# Patient Record
Sex: FEMALE | Race: WHITE | NOT HISPANIC OR LATINO | Employment: OTHER | URBAN - METROPOLITAN AREA
[De-identification: names, ages, dates, MRNs, and addresses within clinical notes are randomized per-mention and may not be internally consistent; named-entity substitution may affect disease eponyms.]

---

## 2023-01-09 ENCOUNTER — HOSPITAL ENCOUNTER (INPATIENT)
Facility: HOSPITAL | Age: 88
LOS: 2 days | Discharge: HOME | DRG: 291 | End: 2023-01-12
Attending: STUDENT IN AN ORGANIZED HEALTH CARE EDUCATION/TRAINING PROGRAM | Admitting: INTERNAL MEDICINE
Payer: MEDICARE

## 2023-01-09 DIAGNOSIS — R06.02 SOB (SHORTNESS OF BREATH): ICD-10-CM

## 2023-01-09 DIAGNOSIS — M79.89 LEG SWELLING: ICD-10-CM

## 2023-01-09 DIAGNOSIS — R07.9 CHEST PAIN: ICD-10-CM

## 2023-01-09 DIAGNOSIS — I50.9 CHF (CONGESTIVE HEART FAILURE): ICD-10-CM

## 2023-01-09 DIAGNOSIS — N39.0 URINARY TRACT INFECTION WITHOUT HEMATURIA, SITE UNSPECIFIED: ICD-10-CM

## 2023-01-09 DIAGNOSIS — N17.9 AKI (ACUTE KIDNEY INJURY): ICD-10-CM

## 2023-01-09 DIAGNOSIS — I50.9 CONGESTIVE HEART FAILURE, UNSPECIFIED HF CHRONICITY, UNSPECIFIED HEART FAILURE TYPE: Primary | ICD-10-CM

## 2023-01-09 LAB
ALBUMIN SERPL-MCNC: 3.6 G/DL (ref 3.4–4.8)
ALBUMIN/GLOB SERPL: 1 RATIO (ref 1.1–2)
ALP SERPL-CCNC: 151 UNIT/L (ref 40–150)
ALT SERPL-CCNC: 29 UNIT/L (ref 0–55)
APPEARANCE UR: CLEAR
AST SERPL-CCNC: 24 UNIT/L (ref 5–34)
BACTERIA #/AREA URNS AUTO: ABNORMAL /HPF
BASOPHILS # BLD AUTO: 0.04 X10(3)/MCL (ref 0–0.2)
BASOPHILS NFR BLD AUTO: 0.6 %
BILIRUB UR QL STRIP.AUTO: NEGATIVE MG/DL
BILIRUBIN DIRECT+TOT PNL SERPL-MCNC: 0.5 MG/DL
BNP BLD-MCNC: 3759.9 PG/ML
BUN SERPL-MCNC: 34.3 MG/DL (ref 9.8–20.1)
CALCIUM SERPL-MCNC: 9.4 MG/DL (ref 8.4–10.2)
CHLORIDE SERPL-SCNC: 99 MMOL/L (ref 98–111)
CO2 SERPL-SCNC: 26 MMOL/L (ref 23–31)
COLOR UR AUTO: YELLOW
CREAT SERPL-MCNC: 1.8 MG/DL (ref 0.55–1.02)
EOSINOPHIL # BLD AUTO: 0.2 X10(3)/MCL (ref 0–0.9)
EOSINOPHIL NFR BLD AUTO: 2.9 %
ERYTHROCYTE [DISTWIDTH] IN BLOOD BY AUTOMATED COUNT: 15.3 % (ref 11.5–17)
GFR SERPLBLD CREATININE-BSD FMLA CKD-EPI: 26 MLS/MIN/1.73/M2
GLOBULIN SER-MCNC: 3.7 GM/DL (ref 2.4–3.5)
GLUCOSE SERPL-MCNC: 109 MG/DL (ref 75–121)
GLUCOSE UR QL STRIP.AUTO: NEGATIVE MG/DL
HCT VFR BLD AUTO: 30 % (ref 37–47)
HGB BLD-MCNC: 9.3 GM/DL (ref 12–16)
HYALINE CASTS URNS QL MICRO: ABNORMAL /LPF
IMM GRANULOCYTES # BLD AUTO: 0.02 X10(3)/MCL (ref 0–0.04)
IMM GRANULOCYTES NFR BLD AUTO: 0.3 %
INR BLD: 1.15 (ref 0–1.3)
KETONES UR QL STRIP.AUTO: NEGATIVE MG/DL
LEUKOCYTE ESTERASE UR QL STRIP.AUTO: ABNORMAL UNIT/L
LYMPHOCYTES # BLD AUTO: 1.2 X10(3)/MCL (ref 0.6–4.6)
LYMPHOCYTES NFR BLD AUTO: 17.6 %
MAGNESIUM SERPL-MCNC: 1.8 MG/DL (ref 1.6–2.6)
MCH RBC QN AUTO: 27.4 PG
MCHC RBC AUTO-ENTMCNC: 31 MG/DL (ref 33–36)
MCV RBC AUTO: 88.2 FL (ref 80–94)
MONOCYTES # BLD AUTO: 0.74 X10(3)/MCL (ref 0.1–1.3)
MONOCYTES NFR BLD AUTO: 10.8 %
NEUTROPHILS # BLD AUTO: 4.63 X10(3)/MCL (ref 2.1–9.2)
NEUTROPHILS NFR BLD AUTO: 67.8 %
NITRITE UR QL STRIP.AUTO: NEGATIVE
NRBC BLD AUTO-RTO: 0 %
PH UR STRIP.AUTO: 5.5 [PH]
PLATELET # BLD AUTO: 326 X10(3)/MCL (ref 130–400)
PMV BLD AUTO: 9.8 FL (ref 7.4–10.4)
POTASSIUM SERPL-SCNC: 4.5 MMOL/L (ref 3.5–5.1)
PROT SERPL-MCNC: 7.3 GM/DL (ref 5.8–7.6)
PROT UR QL STRIP.AUTO: ABNORMAL MG/DL
PROTHROMBIN TIME: 14.6 SECONDS (ref 12.5–14.5)
RBC # BLD AUTO: 3.4 X10(6)/MCL (ref 4.2–5.4)
RBC #/AREA URNS AUTO: ABNORMAL /HPF
RBC UR QL AUTO: NEGATIVE UNIT/L
SODIUM SERPL-SCNC: 135 MMOL/L (ref 132–146)
SP GR UR STRIP.AUTO: 1.02 (ref 1–1.03)
SQUAMOUS #/AREA URNS AUTO: <5 /HPF
TROPONIN I SERPL-MCNC: 0.03 NG/ML (ref 0–0.04)
UROBILINOGEN UR STRIP-ACNC: 0.2 MG/DL
WBC # SPEC AUTO: 6.8 X10(3)/MCL (ref 4.5–11.5)
WBC #/AREA URNS AUTO: 20 /HPF

## 2023-01-09 PROCEDURE — 93005 ELECTROCARDIOGRAM TRACING: CPT

## 2023-01-09 PROCEDURE — 84484 ASSAY OF TROPONIN QUANT: CPT | Performed by: NURSE PRACTITIONER

## 2023-01-09 PROCEDURE — 85610 PROTHROMBIN TIME: CPT | Performed by: NURSE PRACTITIONER

## 2023-01-09 PROCEDURE — 63600175 PHARM REV CODE 636 W HCPCS: Performed by: NURSE PRACTITIONER

## 2023-01-09 PROCEDURE — 83735 ASSAY OF MAGNESIUM: CPT | Performed by: STUDENT IN AN ORGANIZED HEALTH CARE EDUCATION/TRAINING PROGRAM

## 2023-01-09 PROCEDURE — 80053 COMPREHEN METABOLIC PANEL: CPT | Performed by: NURSE PRACTITIONER

## 2023-01-09 PROCEDURE — 93010 ELECTROCARDIOGRAM REPORT: CPT | Mod: ,,, | Performed by: STUDENT IN AN ORGANIZED HEALTH CARE EDUCATION/TRAINING PROGRAM

## 2023-01-09 PROCEDURE — 81001 URINALYSIS AUTO W/SCOPE: CPT | Performed by: STUDENT IN AN ORGANIZED HEALTH CARE EDUCATION/TRAINING PROGRAM

## 2023-01-09 PROCEDURE — 85025 COMPLETE CBC W/AUTO DIFF WBC: CPT | Performed by: NURSE PRACTITIONER

## 2023-01-09 PROCEDURE — 87184 SC STD DISK METHOD PER PLATE: CPT | Performed by: STUDENT IN AN ORGANIZED HEALTH CARE EDUCATION/TRAINING PROGRAM

## 2023-01-09 PROCEDURE — 87077 CULTURE AEROBIC IDENTIFY: CPT | Performed by: STUDENT IN AN ORGANIZED HEALTH CARE EDUCATION/TRAINING PROGRAM

## 2023-01-09 PROCEDURE — 93010 EKG 12-LEAD: ICD-10-PCS | Mod: ,,, | Performed by: STUDENT IN AN ORGANIZED HEALTH CARE EDUCATION/TRAINING PROGRAM

## 2023-01-09 PROCEDURE — 96374 THER/PROPH/DIAG INJ IV PUSH: CPT

## 2023-01-09 PROCEDURE — 99285 EMERGENCY DEPT VISIT HI MDM: CPT | Mod: 25

## 2023-01-09 PROCEDURE — 83880 ASSAY OF NATRIURETIC PEPTIDE: CPT | Performed by: NURSE PRACTITIONER

## 2023-01-09 RX ORDER — FUROSEMIDE 10 MG/ML
60 INJECTION INTRAMUSCULAR; INTRAVENOUS
Status: COMPLETED | OUTPATIENT
Start: 2023-01-09 | End: 2023-01-09

## 2023-01-09 RX ADMIN — FUROSEMIDE 60 MG: 10 INJECTION, SOLUTION INTRAMUSCULAR; INTRAVENOUS at 11:01

## 2023-01-09 NOTE — FIRST PROVIDER EVALUATION
"Medical screening examination initiated.  I have conducted a focused provider triage encounter, findings are as follows:    Brief history of present illness:  Patient and her son state that patient has had SOB. States swelling to her lower extremities. States that she was seen at Urgent Care and was told that she had "fluid" in her lungs.     There were no vitals filed for this visit.    Pertinent physical exam:  Awake, alert    Brief workup plan:  Labs, EKG, Chest X-ray    Preliminary workup initiated; this workup will be continued and followed by the physician or advanced practice provider that is assigned to the patient when roomed.  "

## 2023-01-10 LAB
ANION GAP SERPL CALC-SCNC: 12 MEQ/L
BASOPHILS # BLD AUTO: 0.06 X10(3)/MCL (ref 0–0.2)
BASOPHILS NFR BLD AUTO: 0.8 %
BUN SERPL-MCNC: 35.1 MG/DL (ref 9.8–20.1)
CALCIUM SERPL-MCNC: 9.5 MG/DL (ref 8.4–10.2)
CHLORIDE SERPL-SCNC: 99 MMOL/L (ref 98–111)
CO2 SERPL-SCNC: 23 MMOL/L (ref 23–31)
CREAT SERPL-MCNC: 1.66 MG/DL (ref 0.55–1.02)
CREAT/UREA NIT SERPL: 21
EOSINOPHIL # BLD AUTO: 0.26 X10(3)/MCL (ref 0–0.9)
EOSINOPHIL NFR BLD AUTO: 3.5 %
ERYTHROCYTE [DISTWIDTH] IN BLOOD BY AUTOMATED COUNT: 15.3 % (ref 11.5–17)
GFR SERPLBLD CREATININE-BSD FMLA CKD-EPI: 29 MLS/MIN/1.73/M2
GLUCOSE SERPL-MCNC: 102 MG/DL (ref 75–121)
HCT VFR BLD AUTO: 31.2 % (ref 37–47)
HGB BLD-MCNC: 9.8 GM/DL (ref 12–16)
IMM GRANULOCYTES # BLD AUTO: 0.02 X10(3)/MCL (ref 0–0.04)
IMM GRANULOCYTES NFR BLD AUTO: 0.3 %
LYMPHOCYTES # BLD AUTO: 1.48 X10(3)/MCL (ref 0.6–4.6)
LYMPHOCYTES NFR BLD AUTO: 20.1 %
MAGNESIUM SERPL-MCNC: 1.8 MG/DL (ref 1.6–2.6)
MCH RBC QN AUTO: 27.3 PG
MCHC RBC AUTO-ENTMCNC: 31.4 MG/DL (ref 33–36)
MCV RBC AUTO: 86.9 FL (ref 80–94)
MONOCYTES # BLD AUTO: 0.87 X10(3)/MCL (ref 0.1–1.3)
MONOCYTES NFR BLD AUTO: 11.8 %
NEUTROPHILS # BLD AUTO: 4.69 X10(3)/MCL (ref 2.1–9.2)
NEUTROPHILS NFR BLD AUTO: 63.5 %
NRBC BLD AUTO-RTO: 0 %
PLATELET # BLD AUTO: 291 X10(3)/MCL (ref 130–400)
PMV BLD AUTO: 10.9 FL (ref 7.4–10.4)
POTASSIUM SERPL-SCNC: 4.4 MMOL/L (ref 3.5–5.1)
RBC # BLD AUTO: 3.59 X10(6)/MCL (ref 4.2–5.4)
SARS-COV-2 RDRP RESP QL NAA+PROBE: NEGATIVE
SODIUM SERPL-SCNC: 134 MMOL/L (ref 132–146)
WBC # SPEC AUTO: 7.4 X10(3)/MCL (ref 4.5–11.5)

## 2023-01-10 PROCEDURE — 80048 BASIC METABOLIC PNL TOTAL CA: CPT | Performed by: STUDENT IN AN ORGANIZED HEALTH CARE EDUCATION/TRAINING PROGRAM

## 2023-01-10 PROCEDURE — 83735 ASSAY OF MAGNESIUM: CPT | Performed by: STUDENT IN AN ORGANIZED HEALTH CARE EDUCATION/TRAINING PROGRAM

## 2023-01-10 PROCEDURE — 25000003 PHARM REV CODE 250: Performed by: STUDENT IN AN ORGANIZED HEALTH CARE EDUCATION/TRAINING PROGRAM

## 2023-01-10 PROCEDURE — 63600175 PHARM REV CODE 636 W HCPCS: Performed by: STUDENT IN AN ORGANIZED HEALTH CARE EDUCATION/TRAINING PROGRAM

## 2023-01-10 PROCEDURE — 85025 COMPLETE CBC W/AUTO DIFF WBC: CPT | Performed by: STUDENT IN AN ORGANIZED HEALTH CARE EDUCATION/TRAINING PROGRAM

## 2023-01-10 PROCEDURE — 21400001 HC TELEMETRY ROOM

## 2023-01-10 PROCEDURE — 87635 SARS-COV-2 COVID-19 AMP PRB: CPT | Performed by: STUDENT IN AN ORGANIZED HEALTH CARE EDUCATION/TRAINING PROGRAM

## 2023-01-10 RX ORDER — ALPRAZOLAM 0.25 MG/1
0.25 TABLET ORAL
Status: ACTIVE | OUTPATIENT
Start: 2023-01-10 | End: 2023-01-11

## 2023-01-10 RX ORDER — ONDANSETRON 2 MG/ML
4 INJECTION INTRAMUSCULAR; INTRAVENOUS EVERY 8 HOURS PRN
Status: DISCONTINUED | OUTPATIENT
Start: 2023-01-10 | End: 2023-01-11

## 2023-01-10 RX ORDER — SODIUM CHLORIDE 0.9 % (FLUSH) 0.9 %
10 SYRINGE (ML) INJECTION
Status: DISCONTINUED | OUTPATIENT
Start: 2023-01-10 | End: 2023-01-12 | Stop reason: HOSPADM

## 2023-01-10 RX ORDER — ACETAMINOPHEN 325 MG/1
650 TABLET ORAL EVERY 8 HOURS PRN
Status: DISCONTINUED | OUTPATIENT
Start: 2023-01-10 | End: 2023-01-12 | Stop reason: HOSPADM

## 2023-01-10 RX ORDER — TALC
6 POWDER (GRAM) TOPICAL NIGHTLY PRN
Status: DISCONTINUED | OUTPATIENT
Start: 2023-01-10 | End: 2023-01-12 | Stop reason: HOSPADM

## 2023-01-10 RX ORDER — FUROSEMIDE 10 MG/ML
40 INJECTION INTRAMUSCULAR; INTRAVENOUS
Status: DISCONTINUED | OUTPATIENT
Start: 2023-01-11 | End: 2023-01-11

## 2023-01-10 RX ADMIN — Medication 6 MG: at 12:01

## 2023-01-10 RX ADMIN — Medication 6 MG: at 08:01

## 2023-01-10 RX ADMIN — ACETAMINOPHEN 650 MG: 325 TABLET, FILM COATED ORAL at 01:01

## 2023-01-10 RX ADMIN — ACETAMINOPHEN 650 MG: 325 TABLET, FILM COATED ORAL at 02:01

## 2023-01-10 RX ADMIN — CEFTRIAXONE 1 G: 1 INJECTION, POWDER, FOR SOLUTION INTRAMUSCULAR; INTRAVENOUS at 03:01

## 2023-01-10 NOTE — ED PROVIDER NOTES
Encounter Date: 1/9/2023    SCRIBE #1 NOTE: I, Zee Crockett, am scribing for, and in the presence of,  Pio Fraire IV, MD. I have scribed the following portions of the note - the EKG reading. Other sections scribed: HPI, ROS, PE, MDM.     History     Chief Complaint   Patient presents with    Shortness of Breath     SOB x1 week. Sent by [facility] for fluids on lungs. Also reports leg swelling     89 Y.O. female with a history of AFIB, CHF, and pulmonary HTN presents to the ED after being sent by [facility] in [place] for fluid on lungs. Also reports one episode of pressure-like CP 3 days ago.    Per Pt's son: Pt has been SOB for 1 week with leg swelling. States that she takes 10mg of Lasix per day, but that it is not helping. Further reports that she has worsening orthopnea and is urinating less than normal.    Per chart review: Admitted to Dr. Arteaga 09/2021 for pleural effusion, abdominal pain, nausea, vomiting, and elevated lipase.    Pt's PCP is Keyon Arteaga MD.    The history is provided by the patient, a relative and medical records. No  was used.   Review of patient's allergies indicates:  No Known Allergies  History reviewed. No pertinent past medical history.  History reviewed. No pertinent surgical history.  History reviewed. No pertinent family history.     Review of Systems   Constitutional:  Negative for chills and fever.   HENT:  Negative for congestion, rhinorrhea and sore throat.    Eyes:  Negative for visual disturbance.   Respiratory:  Positive for shortness of breath. Negative for cough.         Orthopnea.   Cardiovascular:  Positive for leg swelling. Negative for chest pain.   Gastrointestinal:  Negative for abdominal pain, nausea and vomiting.   Genitourinary:  Positive for decreased urine volume. Negative for dysuria, hematuria, vaginal bleeding and vaginal discharge.   Musculoskeletal:  Negative for joint swelling.   Skin:  Negative for rash.   Neurological:  Negative for weakness.    Psychiatric/Behavioral:  Negative for confusion.      Physical Exam     Initial Vitals [01/09/23 1756]   BP Pulse Resp Temp SpO2   135/73 107 18 98.2 °F (36.8 °C) 96 %      MAP       --         Physical Exam    Nursing note and vitals reviewed.  Constitutional: She is not diaphoretic. No distress.   HENT:   Head: Normocephalic and atraumatic.   Eyes: EOM are normal. Pupils are equal, round, and reactive to light.   Neck: Neck supple.   Normal range of motion.  Cardiovascular:  Normal rate and regular rhythm.           No murmur heard.  Pulmonary/Chest: No respiratory distress. She has no wheezes. She has rales.   Faint bibasilar crackles bilaterally.   Abdominal: Abdomen is soft. She exhibits no distension. There is no abdominal tenderness.   Musculoskeletal:         General: Edema present. Normal range of motion.      Cervical back: Normal range of motion and neck supple.      Comments: 2+ pitting edema of LE up to knee bilaterally.     Neurological: She is alert and oriented to person, place, and time. She has normal strength.   Skin: Skin is warm. No rash noted.   Psychiatric: She has a normal mood and affect.       ED Course   Procedures  Labs Reviewed   COMPREHENSIVE METABOLIC PANEL - Abnormal; Notable for the following components:       Result Value    Blood Urea Nitrogen 34.3 (*)     Creatinine 1.80 (*)     Globulin 3.7 (*)     Albumin/Globulin Ratio 1.0 (*)     Alkaline Phosphatase 151 (*)     All other components within normal limits   B-TYPE NATRIURETIC PEPTIDE - Abnormal; Notable for the following components:    Natriuretic Peptide 3,759.9 (*)     All other components within normal limits   PROTIME-INR - Abnormal; Notable for the following components:    PT 14.6 (*)     All other components within normal limits   CBC WITH DIFFERENTIAL - Abnormal; Notable for the following components:    RBC 3.40 (*)     Hgb 9.3 (*)     Hct 30.0 (*)     MCHC 31.0 (*)     All other components within normal limits    URINALYSIS, REFLEX TO URINE CULTURE - Abnormal; Notable for the following components:    Protein, UA Trace (*)     Leukocyte Esterase, UA 2+ (*)     All other components within normal limits   URINALYSIS, MICROSCOPIC - Abnormal; Notable for the following components:    WBC, UA 20 (*)     Bacteria, UA Few (*)     Hyaline Casts, UA Few (*)     All other components within normal limits   TROPONIN I - Normal   MAGNESIUM - Normal   CULTURE, URINE   CBC W/ AUTO DIFFERENTIAL    Narrative:     The following orders were created for panel order CBC Auto Differential.  Procedure                               Abnormality         Status                     ---------                               -----------         ------                     CBC with Differential[[id number]]        Abnormal            Final result                 Please view results for these tests on the individual orders.   SARS-COV-2 RNA AMPLIFICATION, QUAL   CBC W/ AUTO DIFFERENTIAL    Narrative:     The following orders were created for panel order CBC auto differential.  Procedure                               Abnormality         Status                     ---------                               -----------         ------                     CBC with Differential[[id number]]                                                         Please view results for these tests on the individual orders.   BASIC METABOLIC PANEL   MAGNESIUM   CBC WITH DIFFERENTIAL     EKG Readings: (Independently Interpreted)   Initial Reading: No STEMI. Rhythm: Sinus Tachycardia. Heart Rate: 101. Ectopy: No Ectopy. Conduction: Normal. ST Segments: Normal ST Segments. T Waves Flipped: I, AVL, V5 and V6. Clinical Impression: Sinus Tachycardia   EKG performed on 1/9/2023 at 1759. No prior for comparison.   ECG Results              EKG 12-lead (Final result)  Result time 01/09/23 18:41:10      Final result by Interface, Lab In [facility] (01/09/23 18:41:10)                   Narrative:    Test  Reason : R06.02,    Vent. Rate : 101 BPM     Atrial Rate : 101 BPM     P-R Int : 144 ms          QRS Dur : 086 ms      QT Int : 336 ms       P-R-T Axes : 061 084 031 degrees     QTc Int : 435 ms    Sinus tachycardia  Nonspecific T wave abnormality  Abnormal ECG  No previous ECGs available  Confirmed by Guilherme Casanova MD ([id number]) on 1/9/2023 6:41:02 PM    Referred By:             Confirmed By:Guilherme Casanova MD                                  Imaging Results              X-Ray Chest PA And Lateral (Final result)  Result time 01/09/23 18:35:51      Final result by Wei Cabrera MD (01/09/23 18:35:51)                   Impression:      Findings consistent with CHF      Electronically signed by: Wei Cabrera  Date:    01/09/2023  Time:    18:35               Narrative:    EXAMINATION:  XR CHEST PA AND LATERAL    CLINICAL HISTORY:  Shortness of Breath;    TECHNIQUE:  PA and lateral views of the chest were performed.    COMPARISON:  08/03/2021    FINDINGS:  There is pulmonary edema and pulmonary venous congestion bilaterally.  There is a small left-sided pleural effusion..  The heart is enlarged in size.  Aorta is unremarkable.  The bones and joints show no acute abnormality.                                       Medications   sodium chloride 0.9% flush 10 mL (has no administration in time range)   melatonin tablet 6 mg (6 mg Oral Given 1/10/23 0041)   acetaminophen tablet 650 mg (650 mg Oral Given 1/10/23 0205)   ondansetron injection 4 mg (has no administration in time range)   furosemide injection 40 mg (has no administration in time range)   furosemide injection 60 mg (60 mg Intravenous Given 1/9/23 2326)     Medical Decision Making:   History:   I obtained history from: someone other than patient.       <> Summary of History: Per Pt's son: Pt has been SOB for 1 week with leg swelling. States that she takes 10mg of Lasix per day, but that it is not helping. Further reports that she has worsening orthopnea  and is urinating less than normal.      Old Records Summarized: records from previous admission(s).       <> Summary of Records: Admitted to Dr. Arteaga 09/2021 for pleural effusion, abdominal pain, nausea, vomiting, and elevated lipase.  Initial Assessment:   89-year-old with a history of CHF diastolic heart failure as well as pulmonary hypertension  Presenting for worsening shortness of breath orthopnea leg swelling went to the walk-in clinic today was told she had fluid in her lungs  Patient is volume overloaded on exam satting well  Chest x-ray with pulmonary edema BNP and 3000 mild JEIMY  Will admit to primary care Dr. Arteaga at this time will diurese with IV Lasix    Differential Diagnosis:   Judging by the patient's chief complaint and pertinent history, the patient has the following possible differential diagnoses, including but not limited to the following.  Some of these are deemed to be lower likelihood and some more likely based on my physical exam and history combined with possible lab work and/or imaging studies.   Please see the pertinent studies, and refer to the HPI.  Some of these diagnoses will take further evaluation to fully rule out, perhaps as an outpatient and the patient was encouraged to follow up when discharged for more comprehensive evaluation.    ACS, pneumonia, COVID/Flu, congestive heart failure, asthma, COPD, pleural effusion, pulmonary edema, acute bronchitis, PE, pneumothorax, hemothorax, aortic dissection, electrolyte abnormalities, anemia, anxiety     Clinical Tests:   Lab Tests: Ordered and Reviewed  Radiological Study: Ordered and Reviewed  Medical Tests: Ordered and Reviewed        Scribe Attestation:   Scribe #1: I performed the above scribed service and the documentation accurately describes the services I performed. I attest to the accuracy of the note.      Medical Decision Making  Problems Addressed:  JEIMY (acute kidney injury): acute illness or injury that poses a threat to  life or bodily functions  Congestive heart failure, unspecified HF chronicity, unspecified heart failure type: chronic illness or injury with exacerbation, progression, or side effects of treatment  Leg swelling: acute illness or injury that poses a threat to life or bodily functions  SOB (shortness of breath): acute illness or injury that poses a threat to life or bodily functions  Urinary tract infection without hematuria, site unspecified: acute illness or injury that poses a threat to life or bodily functions      Amount and/or Complexity of Data Reviewed  Independent Historian: caregiver  (see above for summary)  External Data Reviewed: notes from previous admissions, prior labs, prior EKGs, prior imaging, and prescription medications  (see above for summary)  Risk and benefits of testing: discussed   Labs: ordered and reviewed  Radiology: ordered and independent interpretation performed (see above)  ECG/medicine tests: ordered and independent interpretation performed (see above)  Discussion of management or test interpretation with external provider(s): discussed with primary care physician    Risk  Parenteral medications  Drug therapy requiring intense monitoring for toxicity   Decision regarding hospitalization    Critical Care  none            ED Course as of 01/10/23 0255   Mon Jan 09, 2023   2240 Dr. Chandler marcos. [AS]   2320 Admitted to Dr. Arteaga. [AS]      ED Course User Index  [AS] Melisa Keira                   Clinical Impression:   Final diagnoses:  [R06.02] SOB (shortness of breath)  [I50.9] Congestive heart failure, unspecified HF chronicity, unspecified heart failure type (Primary)  [M79.89] Leg swelling        ED Disposition Condition    Admit Stable        Pio HUMPHREYS MD personally performed the history, PE, MDM, and procedures as documented above and agree with the scribe's documentation.           Pio Fraire IV, MD  01/10/23 3518

## 2023-01-11 LAB
AV INDEX (PROSTH): 0.51
AV MEAN GRADIENT: 2 MMHG
AV PEAK GRADIENT: 3 MMHG
AV VALVE AREA: 1.46 CM2
AV VELOCITY RATIO: 0.6
CV ECHO LV RWT: 0.32 CM
DOP CALC AO PEAK VEL: 0.93 M/S
DOP CALC AO VTI: 14 CM
DOP CALC LVOT AREA: 2.8 CM2
DOP CALC LVOT DIAMETER: 1.9 CM
DOP CALC LVOT PEAK VEL: 0.56 M/S
DOP CALC LVOT STROKE VOLUME: 20.4 CM3
DOP CALC MV VTI: 16 CM
DOP CALCLVOT PEAK VEL VTI: 7.2 CM
E WAVE DECELERATION TIME: 132 MSEC
E/A RATIO: 1.97
E/E' RATIO: 12.21 M/S
ECHO LV POSTERIOR WALL: 1.04 CM (ref 0.6–1.1)
EJECTION FRACTION: 27 %
FRACTIONAL SHORTENING: 17 % (ref 28–44)
INTERVENTRICULAR SEPTUM: 0.7 CM (ref 0.6–1.1)
LEFT ATRIUM SIZE: 5 CM
LEFT ATRIUM VOLUME MOD: 77.8 CM3
LEFT INTERNAL DIMENSION IN SYSTOLE: 5.33 CM (ref 2.1–4)
LEFT VENTRICLE DIASTOLIC VOLUME: 210 ML
LEFT VENTRICLE SYSTOLIC VOLUME: 137 ML
LEFT VENTRICULAR INTERNAL DIMENSION IN DIASTOLE: 6.42 CM (ref 3.5–6)
LEFT VENTRICULAR MASS: 232.5 G
LV LATERAL E/E' RATIO: 14.5 M/S
LV SEPTAL E/E' RATIO: 10.55 M/S
LVOT MG: 1 MMHG
LVOT MV: 0.35 CM/S
MR PISA EROA: 0.33 CM2
MV MEAN GRADIENT: 3 MMHG
MV PEAK A VEL: 0.59 M/S
MV PEAK E VEL: 1.16 M/S
MV PEAK GRADIENT: 5 MMHG
MV STENOSIS PRESSURE HALF TIME: 72 MS
MV VALVE AREA BY CONTINUITY EQUATION: 1.28 CM2
MV VALVE AREA P 1/2 METHOD: 3.06 CM2
PISA MRMAX VEL: 5.98 M/S
PISA RADIUS: 1 CM
PISA TR MAX VEL: 3.57 M/S
PISA VN NYQUIST MS: 0.31 M/S
PISA VN NYQUIST: 0.31 M/S
PV PEAK VELOCITY: 0.74 CM/S
RA PRESSURE: 15 MMHG
RIGHT VENTRICULAR END-DIASTOLIC DIMENSION: 3.25 CM
TDI LATERAL: 0.08 M/S
TDI SEPTAL: 0.11 M/S
TDI: 0.1 M/S
TR MAX PG: 51 MMHG
TRICUSPID ANNULAR PLANE SYSTOLIC EXCURSION: 1.22 CM
TROPONIN I SERPL-MCNC: 0.03 NG/ML (ref 0–0.04)
TV REST PULMONARY ARTERY PRESSURE: 66 MMHG

## 2023-01-11 PROCEDURE — 36415 COLL VENOUS BLD VENIPUNCTURE: CPT | Performed by: INTERNAL MEDICINE

## 2023-01-11 PROCEDURE — 84484 ASSAY OF TROPONIN QUANT: CPT | Performed by: INTERNAL MEDICINE

## 2023-01-11 PROCEDURE — 25000003 PHARM REV CODE 250: Performed by: STUDENT IN AN ORGANIZED HEALTH CARE EDUCATION/TRAINING PROGRAM

## 2023-01-11 PROCEDURE — 93010 ELECTROCARDIOGRAM REPORT: CPT | Mod: ,,, | Performed by: INTERNAL MEDICINE

## 2023-01-11 PROCEDURE — 93005 ELECTROCARDIOGRAM TRACING: CPT

## 2023-01-11 PROCEDURE — 63600175 PHARM REV CODE 636 W HCPCS: Performed by: STUDENT IN AN ORGANIZED HEALTH CARE EDUCATION/TRAINING PROGRAM

## 2023-01-11 PROCEDURE — 93010 EKG 12-LEAD: ICD-10-PCS | Mod: ,,, | Performed by: INTERNAL MEDICINE

## 2023-01-11 PROCEDURE — 21400001 HC TELEMETRY ROOM

## 2023-01-11 PROCEDURE — 25000003 PHARM REV CODE 250: Performed by: INTERNAL MEDICINE

## 2023-01-11 RX ORDER — ALPRAZOLAM 0.5 MG/1
0.5 TABLET ORAL EVERY 4 HOURS PRN
Status: DISCONTINUED | OUTPATIENT
Start: 2023-01-11 | End: 2023-01-12 | Stop reason: HOSPADM

## 2023-01-11 RX ORDER — ONDANSETRON 4 MG/1
4 TABLET, ORALLY DISINTEGRATING ORAL ONCE
Status: DISCONTINUED | OUTPATIENT
Start: 2023-01-11 | End: 2023-01-11

## 2023-01-11 RX ORDER — FUROSEMIDE 40 MG/1
40 TABLET ORAL 2 TIMES DAILY
Status: DISCONTINUED | OUTPATIENT
Start: 2023-01-11 | End: 2023-01-12 | Stop reason: HOSPADM

## 2023-01-11 RX ORDER — ONDANSETRON 4 MG/1
4 TABLET, ORALLY DISINTEGRATING ORAL EVERY 6 HOURS PRN
Status: DISCONTINUED | OUTPATIENT
Start: 2023-01-11 | End: 2023-01-12 | Stop reason: HOSPADM

## 2023-01-11 RX ADMIN — FUROSEMIDE 40 MG: 40 TABLET ORAL at 08:01

## 2023-01-11 RX ADMIN — Medication 6 MG: at 08:01

## 2023-01-11 RX ADMIN — ALPRAZOLAM 0.5 MG: 0.5 TABLET ORAL at 09:01

## 2023-01-11 RX ADMIN — FUROSEMIDE 40 MG: 10 INJECTION, SOLUTION INTRAMUSCULAR; INTRAVENOUS at 12:01

## 2023-01-11 NOTE — PROGRESS NOTES
[facility]                       [address] [place] [id number]    PATIENT NAME:       TABITHA PRESTON   YOB: 1932  CSN:                [id number]   MRN:                [id number]  ADMIT DATE:         01/09/2023 18:03:00  PHYSICIAN:          Keyon Arteaga MD                            PROGRESS NOTE    DATE:      SUBJECTIVE:  This is a 89-year-old  Tung female.  She was admitted and   today feels better besides being a little bit anxious.  Her breathing on room   air is improved with the sats in the mid to high 90s.  She denies any chest pain   or palpitations.  No fever, chills, or any other new problems.  Her leg   swelling has improved some.  No other significant issues at the present time.    OBJECTIVE:  VITAL SIGNS:  Blood pressure 140/77, pulse 93, temp 98.2.  GENERAL APPEARANCE:  She is alert, in no acute distress.  HEART:  Regular rhythm and rate.  LUNGS:  She has decreased breath sounds with a few bibasilar crackles.  ABDOMEN:  Soft, nontender.  EXTREMITIES:  She has +2 edema, slightly improved from yesterday.    NEUROLOGIC:  Nonfocal.    LABS:  Her CBC shows 7.4 white cells, 9.8 and 31.2 H and H, 291 platelets.  BUN   35.1, creatinine 1.66, magnesium 1.80.    IMPRESSION:    1. Acute on chronic congestive heart failure exacerbation with acute hypoxic   respiratory failure.    She has history of:  1. Valvular disease.  2. Hypertension.  3. Dyslipidemia.  4. Mild anemia.  5. Osteoarthritis.  6. 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  7. Mildly deconditioned.  8. Clinical malnutrition.    PLAN:  The patient will continue on IV Lasix.  We will give her a dose of Xanax   0.25 to 0.5 q.4 for anxiety.  We will continue with other current medications.    We will continue with DVT prophylaxis.        ______________________________  Keyon Arteaga MD    RACHANA/AQS  DD:  01/10/2023  Time:  07:09PM  DT:   01/10/2023  Time:  07:45PM  Job #:  [id number]      PROGRESS NOTE

## 2023-01-11 NOTE — PROGRESS NOTES
Inpatient Nutrition Evaluation    Admit Date: 1/9/2023   Total duration of encounter: 2 days    Nutrition Recommendation/Prescription     - continue regular diet; pt prefers soft foods  - add boost plus oral supplement for additional nutrition; 360kcal, 14 gm protein per container    Nutrition Assessment     Chart Review    Reason Seen: continuous nutrition monitoring    Malnutrition Screening Tool Results                Diagnosis:   Acute on chronic congestive heart failure exacerbation with acute hypoxic   respiratory failure    Relevant Medical History: 1. Valvular disease.  2. Hypertension.  3. Dyslipidemia.  4. Mild anemia.  5. Osteoarthritis.  6. 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    Nutrition-Related Medications: furosemide    Nutrition-Related Labs:  1/10: BUN 35.1, Crea1.66    Diet Order: Diet Adult Regular Dysphagia Easy to Chew  Oral Supplement Order: Boost Plus  Appetite/Oral Intake: good/50-75% of meals  Factors Affecting Nutritional Intake: chewing difficulty  Food/Gnosticist/Cultural Preferences:  soft foods and easy to chew meats  Food Allergies: none reported       Wound(s):   none noted    Comments    1/11: spoke with family member using translation ipad; pt tolerating oral diet, fair appetite, eating meals but prefers softer foods, agreeable to adding boost with meals; unsure of any weight loss recently, says it's possible    Anthropometrics    Height: 5' (152.4 cm) Height Method: Estimated  Last Weight: 58.8 kg (129 lb 10.1 oz) (01/11/23 1356) Weight Method: Standard Scale  BMI (Calculated): 25.3  BMI Classification: overweight (BMI 25-29.9)     Ideal Body Weight (IBW), Female: 100 lb     % Ideal Body Weight, Female (lb): 129.63 %                             Usual Weight Provided By: patient denies unintentional weight loss    Wt Readings from Last 3 Encounters:   01/11/23 1356 58.8 kg (129 lb 10.1 oz)   01/11/23 0500 58.8 kg (129 lb 11.2 oz)      Weight Change(s) Since Admission:  Admit  Weight: 58.8 kg (129 lb 11.2 oz) (01/11/23 0500)      Patient Education    Not applicable.    Monitoring & Evaluation     Dietitian will monitor food and beverage intake and weight change.  Nutrition Risk/Follow-Up: low (follow-up in 5-7 days)  Patients assigned 'low nutrition risk' status do not qualify for a full nutritional assessment but will be monitored and re-evaluated in a 5-7 day time period. Please consult if re-evaluation needed sooner.

## 2023-01-11 NOTE — NURSING
Nurses Note -- 4 Eyes      1/10/2023   7:26 PM      Skin assessed during: Admit      [x] No Pressure Injuries Present    []Prevention Measures Documented      [] Yes- Altered Skin Integrity Present or Discovered   [] LDA Added if Not in Epic (Describe Wound)   [] New Altered Skin Integrity was Present on Admit and Documented in LDA   [] Wound Image Taken    Wound Care Consulted? No    Attending Nurse:  Martha Vences RN     Second RN/Staff Member:  Warren Mixon RN

## 2023-01-12 VITALS
HEIGHT: 60 IN | SYSTOLIC BLOOD PRESSURE: 133 MMHG | OXYGEN SATURATION: 96 % | TEMPERATURE: 98 F | DIASTOLIC BLOOD PRESSURE: 62 MMHG | HEART RATE: 96 BPM | RESPIRATION RATE: 20 BRPM | BODY MASS INDEX: 25.34 KG/M2 | WEIGHT: 129.06 LBS

## 2023-01-12 PROBLEM — M79.89 LEG SWELLING: Status: ACTIVE | Noted: 2023-01-12

## 2023-01-12 PROBLEM — N17.9 AKI (ACUTE KIDNEY INJURY): Status: ACTIVE | Noted: 2023-01-12

## 2023-01-12 PROBLEM — I50.9 ACUTE CHF (CONGESTIVE HEART FAILURE): Status: ACTIVE | Noted: 2023-01-12

## 2023-01-12 PROBLEM — R06.02 SOB (SHORTNESS OF BREATH): Status: RESOLVED | Noted: 2023-01-12 | Resolved: 2023-01-12

## 2023-01-12 PROBLEM — N17.9 AKI (ACUTE KIDNEY INJURY): Status: RESOLVED | Noted: 2023-01-12 | Resolved: 2023-01-12

## 2023-01-12 PROBLEM — R06.02 SOB (SHORTNESS OF BREATH): Status: ACTIVE | Noted: 2023-01-12

## 2023-01-12 PROBLEM — I50.9 ACUTE CHF (CONGESTIVE HEART FAILURE): Status: RESOLVED | Noted: 2023-01-12 | Resolved: 2023-01-12

## 2023-01-12 PROCEDURE — 25000003 PHARM REV CODE 250: Performed by: INTERNAL MEDICINE

## 2023-01-12 PROCEDURE — 94761 N-INVAS EAR/PLS OXIMETRY MLT: CPT

## 2023-01-12 RX ORDER — CARVEDILOL 25 MG/1
25 TABLET ORAL 2 TIMES DAILY
COMMUNITY
Start: 2022-11-25

## 2023-01-12 RX ORDER — OMEPRAZOLE 40 MG/1
40 CAPSULE, DELAYED RELEASE ORAL EVERY MORNING
COMMUNITY
Start: 2022-11-29

## 2023-01-12 RX ORDER — LISINOPRIL 40 MG/1
40 TABLET ORAL DAILY
COMMUNITY
Start: 2022-11-25

## 2023-01-12 RX ORDER — DAPAGLIFLOZIN 5 MG/1
5 TABLET, FILM COATED ORAL DAILY
Qty: 90 TABLET | Refills: 3 | Status: SHIPPED | OUTPATIENT
Start: 2023-01-12

## 2023-01-12 RX ORDER — ATORVASTATIN CALCIUM 40 MG/1
40 TABLET, FILM COATED ORAL DAILY
COMMUNITY
Start: 2022-11-25

## 2023-01-12 RX ORDER — DILTIAZEM HYDROCHLORIDE 30 MG/1
30 TABLET, FILM COATED ORAL
COMMUNITY

## 2023-01-12 RX ORDER — QUETIAPINE FUMARATE 50 MG/1
50 TABLET, FILM COATED ORAL NIGHTLY
COMMUNITY
Start: 2022-12-26

## 2023-01-12 RX ORDER — FUROSEMIDE 40 MG/1
40 TABLET ORAL 2 TIMES DAILY
Qty: 60 TABLET | Refills: 11 | Status: SHIPPED | OUTPATIENT
Start: 2023-01-12 | End: 2024-01-12

## 2023-01-12 RX ORDER — AMLODIPINE BESYLATE 10 MG/1
10 TABLET ORAL DAILY
COMMUNITY
Start: 2022-11-25

## 2023-01-12 RX ADMIN — FUROSEMIDE 40 MG: 40 TABLET ORAL at 09:01

## 2023-01-12 NOTE — PLAN OF CARE
01/12/23 1428   Final Note   Assessment Type Final Discharge Note   Anticipated Discharge Disposition Home-Health   Hospital Resources/Appts/Education Provided Post-Acute resouces added to AVS   Post-Acute Status   Post-Acute Authorization Home Health   Home Health Status Referrals Sent  ([facility])   Discharge Delays None known at this time

## 2023-01-12 NOTE — CLINICAL REVIEW
.  EHR review, recommend IP stay. Sent to [facility] for reconsideration of denial.

## 2023-01-13 LAB
BACTERIA UR CULT: ABNORMAL
BACTERIA UR CULT: ABNORMAL

## 2023-01-24 NOTE — PHYSICIAN QUERY
PT Name: Carlene Hilliard  MR #: [id number]     DOCUMENTATION CLARIFICATION     CDS/: LUIS Rao, RN, CCDS               Contact information: [email or web address]  This form is a permanent document in the medical record.     Query Date: January 24, 2023    By submitting this query, we are merely seeking further clarification of documentation.  Please utilize your independent clinical judgment when addressing the question(s) below.  The Medical Record contains the following   Indicators   Supporting Clinical Findings Location in Medical Record   X Documentation of Respiratory Failure, ARDS Acute hypoxic respiratory failure secondary to acute-on-chronic congestive heart failure   H & P: Dr. Arteaga 1/5   X SOB, GODWIN, Wheezing, Productive Cough, Use of Accessory Muscles, etc. increased shortness of breath  H & P: Dr. Arteaga 1/5   X RR     ABGs     O2 sat RR: 18, 21  SpO2: 96%, 95%    slightly tachypneic       flowsheet      H & P: Chandler [place] 1/5    Hypoxia/Hypercapnia      BiPAP/Intubation/Mechanical Ventilation      Supplemental O2      Home O2, Oxygen Dependence     X Respiratory distress  No respiratory distress   ED note: Dr. Fraire IV 1/9    Radiology findings     X Acute/Chronic Illness history of CHF diastolic heart failure as well as pulmonary hypertension   ED note: Dr. Fraire IV 1/9    Treatment      Other         The noted clinical guidelines following a diagnosis are only system guidelines and do not replace the providers clinical judgment.    Due to the conflicting clinical picture, please clinically validate the diagnosis of _acute hypoxic respiratory failure_.    If validated, please provide additional clinical support for the diagnosis.     [   x ] Above stated diagnosis is not confirmed and/or it has been ruled out   [    ] Above stated diagnosis is not confirmed and/or it has been ruled out, other diagnosis ruled in (please specify):_______________   [    ] Acute Respiratory Failure with  Hypoxia (ABG pO2 < 60 mmHg or O2 sat of <91% on room air and respiratory symptoms documented) diagnosis is confirmed and additional clinical support/decision-making indicators for the diagnosis include (please specify): _______________________________________________   [    ] Other clarification (please specify): ___________________       Please document in your progress notes daily for the duration of treatment until resolved and include in your discharge summary.     Reference:    KIMO Grant MD. (2020, March 13). Acute respiratory distress syndrome: Clinical features, diagnosis, and complications in adults ([id number] TALI Pham MD & [id number] ALVIN Saleh MD, Eds.). Retrieved November 13, 2020, from https://[email or web address]/contents/acute-respiratory-distress-syndrome-clinical-features-diagnosis-and-complications-in-adults?search=ards&source=search_result&selectedTitle=1~150&usage_type=default&display_rank=1  Form No. 02479

## 2023-01-27 NOTE — DISCHARGE SUMMARY
[facility]                       [address] [place] [address]    PATIENT NAME:       TABITHA PRESTON   YOB: 1932  CSN:                [id number]   MRN:                [id number]  ADMIT DATE:         01/09/2023 18:03:00  PHYSICIAN:          Keyon Arteaga MD                          DISCHARGE SUMMARY    DATE OF DISCHARGE:  01/12/2023 17:05:00    HISTORY AND HOSPITAL COURSE:  89-year-old  Tung female.  She was   initially admitted on the 9th of January.  She had tachypnea and shortness of   breath over the last few days prior to her admission.  She was found to have   fluid overload.    Prior to that she was seen at a walk-in clinic in [place].  She had significant   swelling to her lower extremities and bibasilar crackles.  Her BNP was 2760.    She had pulmonary edema by x-ray.  She was diuresed and she was placed on oxygen   to keep the sats over 90.      She is slowly improving her condition and she had some episodes of anxiety for   which she had Xanax.  She was on DVT prophylaxis as well.  She improved and she   was discharged in good and stable condition on the 12th of October.    FINAL DIAGNOSES:  Include:  1. Acute-on-chronic congestive heart failure exacerbation with acute hypoxic   respiratory failure.  2. History of valvular disease.  3. Hypertension.  4. Dyslipidemia.  5. Mild anemia.  6. Osteoarthritis.  7. Chronic kidney disease stage 2 to 3.  8. Deconditioning and protein-calorie malnutrition.    The patient had an echocardiogram that showed EF of 27%, grade 1 ventricular   diastolic dysfunction.  She had moderate right and left atrial enlargement,   moderate-to-severe mitral regurgitation with elevated pulmonary pressures with   pulmonary hypertension, severe tricuspid regurg.    PLAN:    1. Patient will continue with diuresis.    2. She will continue with the current medications.   Please refer to the   discharge paperwork for a complete list of the medications and diet and medical   treatment.  3. She will be for a followup at home health.   4. She should follow up in 1 to 2 weeks with me.        ______________________________  MD RACHANA Delcid/KYREE  DD:  01/26/2023  Time:  05:11PM  DT:  01/26/2023  Time:  07:36PM  Job #:  [id number]      DISCHARGE SUMMARY

## 2023-03-10 ENCOUNTER — LAB REQUISITION (OUTPATIENT)
Dept: LAB | Facility: HOSPITAL | Age: 88
End: 2023-03-10
Attending: INTERNAL MEDICINE
Payer: MEDICARE

## 2023-03-10 DIAGNOSIS — I13.0 HYPERTENSIVE HEART AND CHRONIC KIDNEY DISEASE WITH HEART FAILURE AND STAGE 1 THROUGH STAGE 4 CHRONIC KIDNEY DISEASE, OR UNSPECIFIED CHRONIC KIDNEY DISEASE: ICD-10-CM

## 2023-03-10 DIAGNOSIS — I50.9 HEART FAILURE, UNSPECIFIED: ICD-10-CM

## 2023-03-10 LAB
ALBUMIN SERPL-MCNC: 4 G/DL (ref 3.4–4.8)
ALBUMIN/GLOB SERPL: 1 RATIO (ref 1.1–2)
ALP SERPL-CCNC: 92 UNIT/L (ref 40–150)
ALT SERPL-CCNC: 15 UNIT/L (ref 0–55)
AST SERPL-CCNC: 28 UNIT/L (ref 5–34)
BASOPHILS # BLD AUTO: 0.04 X10(3)/MCL (ref 0–0.2)
BASOPHILS NFR BLD AUTO: 0.7 %
BILIRUBIN DIRECT+TOT PNL SERPL-MCNC: 0.4 MG/DL
BNP BLD-MCNC: 972.4 PG/ML
BUN SERPL-MCNC: 24.9 MG/DL (ref 9.8–20.1)
CALCIUM SERPL-MCNC: 9.7 MG/DL (ref 8.4–10.2)
CHLORIDE SERPL-SCNC: 99 MMOL/L (ref 98–111)
CO2 SERPL-SCNC: 29 MMOL/L (ref 23–31)
CREAT SERPL-MCNC: 1.53 MG/DL (ref 0.55–1.02)
EOSINOPHIL # BLD AUTO: 0.19 X10(3)/MCL (ref 0–0.9)
EOSINOPHIL NFR BLD AUTO: 3.5 %
ERYTHROCYTE [DISTWIDTH] IN BLOOD BY AUTOMATED COUNT: 17.5 % (ref 11.5–17)
GFR SERPLBLD CREATININE-BSD FMLA CKD-EPI: 32 MLS/MIN/1.73/M2
GLOBULIN SER-MCNC: 3.9 GM/DL (ref 2.4–3.5)
GLUCOSE SERPL-MCNC: 101 MG/DL (ref 75–121)
HCT VFR BLD AUTO: 32.9 % (ref 37–47)
HGB BLD-MCNC: 9.7 G/DL (ref 12–16)
IMM GRANULOCYTES # BLD AUTO: 0 X10(3)/MCL (ref 0–0.04)
IMM GRANULOCYTES NFR BLD AUTO: 0 %
LYMPHOCYTES # BLD AUTO: 1.21 X10(3)/MCL (ref 0.6–4.6)
LYMPHOCYTES NFR BLD AUTO: 22.2 %
MCH RBC QN AUTO: 24.2 PG
MCHC RBC AUTO-ENTMCNC: 29.5 G/DL (ref 33–36)
MCV RBC AUTO: 82 FL (ref 80–94)
MONOCYTES # BLD AUTO: 0.68 X10(3)/MCL (ref 0.1–1.3)
MONOCYTES NFR BLD AUTO: 12.5 %
NEUTROPHILS # BLD AUTO: 3.34 X10(3)/MCL (ref 2.1–9.2)
NEUTROPHILS NFR BLD AUTO: 61.1 %
PLATELET # BLD AUTO: 266 X10(3)/MCL (ref 130–400)
PMV BLD AUTO: 11.1 FL (ref 7.4–10.4)
POTASSIUM SERPL-SCNC: 5 MMOL/L (ref 3.5–5.1)
PROT SERPL-MCNC: 7.9 GM/DL (ref 5.8–7.6)
RBC # BLD AUTO: 4.01 X10(6)/MCL (ref 4.2–5.4)
SODIUM SERPL-SCNC: 141 MMOL/L (ref 132–146)
WBC # SPEC AUTO: 5.5 X10(3)/MCL (ref 4.5–11.5)

## 2023-03-10 PROCEDURE — 83880 ASSAY OF NATRIURETIC PEPTIDE: CPT | Performed by: INTERNAL MEDICINE

## 2023-03-10 PROCEDURE — 85025 COMPLETE CBC W/AUTO DIFF WBC: CPT | Performed by: INTERNAL MEDICINE

## 2023-03-10 PROCEDURE — 80053 COMPREHEN METABOLIC PANEL: CPT | Performed by: INTERNAL MEDICINE
